# Patient Record
Sex: FEMALE | Race: WHITE | NOT HISPANIC OR LATINO | URBAN - METROPOLITAN AREA
[De-identification: names, ages, dates, MRNs, and addresses within clinical notes are randomized per-mention and may not be internally consistent; named-entity substitution may affect disease eponyms.]

---

## 2018-03-03 ENCOUNTER — EMERGENCY (EMERGENCY)
Facility: HOSPITAL | Age: 58
LOS: 1 days | Discharge: HOME | End: 2018-03-03
Attending: EMERGENCY MEDICINE | Admitting: EMERGENCY MEDICINE
Payer: COMMERCIAL

## 2018-03-03 VITALS
HEART RATE: 84 BPM | SYSTOLIC BLOOD PRESSURE: 119 MMHG | RESPIRATION RATE: 19 BRPM | TEMPERATURE: 99 F | OXYGEN SATURATION: 98 % | DIASTOLIC BLOOD PRESSURE: 69 MMHG

## 2018-03-03 VITALS
WEIGHT: 179.9 LBS | DIASTOLIC BLOOD PRESSURE: 97 MMHG | TEMPERATURE: 97 F | HEART RATE: 84 BPM | RESPIRATION RATE: 20 BRPM | SYSTOLIC BLOOD PRESSURE: 160 MMHG | OXYGEN SATURATION: 100 %
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
## 2018-03-03 DIAGNOSIS — Z23 ENCOUNTER FOR IMMUNIZATION: ICD-10-CM

## 2018-03-03 DIAGNOSIS — S01.01XA LACERATION WITHOUT FOREIGN BODY OF SCALP, INITIAL ENCOUNTER: ICD-10-CM
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
## 2018-03-03 DIAGNOSIS — F10.120 ALCOHOL ABUSE WITH INTOXICATION, UNCOMPLICATED: ICD-10-CM
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
## 2018-03-03 DIAGNOSIS — S09.90XA UNSPECIFIED INJURY OF HEAD, INITIAL ENCOUNTER: ICD-10-CM

## 2018-03-03 PROCEDURE — 70450 CT HEAD/BRAIN W/O DYE: CPT

## 2018-03-03 PROCEDURE — 72125 CT NECK SPINE W/O DYE: CPT | Mod: 26

## 2018-03-03 PROCEDURE — 90471 IMMUNIZATION ADMIN: CPT

## 2018-03-03 PROCEDURE — 99283 EMERGENCY DEPT VISIT LOW MDM: CPT | Mod: 25

## 2018-03-03 PROCEDURE — 12002 RPR S/N/AX/GEN/TRNK2.6-7.5CM: CPT

## 2018-03-03 PROCEDURE — 70450 CT HEAD/BRAIN W/O DYE: CPT | Mod: 26

## 2018-03-03 PROCEDURE — 99285 EMERGENCY DEPT VISIT HI MDM: CPT | Mod: 25

## 2018-03-03 PROCEDURE — 90715 TDAP VACCINE 7 YRS/> IM: CPT

## 2018-03-03 PROCEDURE — 82962 GLUCOSE BLOOD TEST: CPT

## 2018-03-03 PROCEDURE — 72125 CT NECK SPINE W/O DYE: CPT

## 2018-03-03 RX ORDER — TETANUS TOXOID, REDUCED DIPHTHERIA TOXOID AND ACELLULAR PERTUSSIS VACCINE, ADSORBED 5; 2.5; 8; 8; 2.5 [IU]/.5ML; [IU]/.5ML; UG/.5ML; UG/.5ML; UG/.5ML
0.5 SUSPENSION INTRAMUSCULAR ONCE
Qty: 0 | Refills: 0 | Status: COMPLETED | OUTPATIENT
Start: 2018-03-03 | End: 2018-03-03

## 2018-03-03 RX ADMIN — TETANUS TOXOID, REDUCED DIPHTHERIA TOXOID AND ACELLULAR PERTUSSIS VACCINE, ADSORBED 0.5 MILLILITER(S): 5; 2.5; 8; 8; 2.5 SUSPENSION INTRAMUSCULAR at 02:19

## 2018-03-03 NOTE — ED PROVIDER NOTE - OBJECTIVE STATEMENT
57F s/p fall from bed. per  pt intoxicated and fell out of bed accidentally.  hit head on ceramic, +laceration to L scalp. no LOC. no vomiting.  unk last tetanus.

## 2018-03-03 NOTE — ED PROVIDER NOTE - CARE PLAN
Principal Discharge DX:	Scalp laceration, initial encounter  Secondary Diagnosis:	Alcoholic intoxication without complication  Secondary Diagnosis:	Injury of head, initial encounter

## 2018-03-03 NOTE — ED PROVIDER NOTE - PROGRESS NOTE DETAILS
laceration irrigated and stapled, pressure dressing applied. will continue to monitor. aox3, fluent speech, steady gait,  at bedside to accompany pt home. advised staple removal in 1 week. bacitracin applied.  I have discussed the discharge plan with the patient. The patient agrees with the plan, as discussed.  The patient understands Emergency Department diagnosis is a preliminary diagnosis often based on limited information and that the patient must adhere to the follow-up plan as discussed.  The patient understands that if the symptoms worsen or if prescribed medications do not have the desired/planned effect that the patient may return to the Emergency Department at any time for further evaluation and treatment.

## 2018-03-03 NOTE — ED ADULT TRIAGE NOTE - ARRIVAL INFO ADDITIONAL COMMENTS
pt presents from home, "she rolled out of bed", occipital head lac (bandages in place by EMS). denies use of blood thinners. admits to drinking two glasses of wine

## 2018-03-03 NOTE — ED ADULT NURSE NOTE - OBJECTIVE STATEMENT
accdg to the fiance', pt took 2-4 glasses of wine and she is taking alcohol almost on everyday basis - accdg to the fiance', pt took 2-4 glasses of wine and she is taking alcohol almost on everyday basis

## 2019-01-17 ENCOUNTER — EMERGENCY (EMERGENCY)
Facility: HOSPITAL | Age: 59
LOS: 1 days | Discharge: HOME | End: 2019-01-17
Attending: EMERGENCY MEDICINE | Admitting: EMERGENCY MEDICINE
Payer: COMMERCIAL

## 2019-01-17 VITALS
OXYGEN SATURATION: 99 % | SYSTOLIC BLOOD PRESSURE: 129 MMHG | HEART RATE: 64 BPM | TEMPERATURE: 98 F | DIASTOLIC BLOOD PRESSURE: 71 MMHG | RESPIRATION RATE: 18 BRPM
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
## 2019-01-17 DIAGNOSIS — F10.129 ALCOHOL ABUSE WITH INTOXICATION, UNSPECIFIED: ICD-10-CM
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
## 2019-01-17 DIAGNOSIS — S01.01XA LACERATION WITHOUT FOREIGN BODY OF SCALP, INITIAL ENCOUNTER: ICD-10-CM
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
## 2019-01-17 DIAGNOSIS — S09.90XA UNSPECIFIED INJURY OF HEAD, INITIAL ENCOUNTER: ICD-10-CM
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
## 2019-01-17 PROCEDURE — 99285 EMERGENCY DEPT VISIT HI MDM: CPT | Mod: 25

## 2019-01-17 PROCEDURE — 70450 CT HEAD/BRAIN W/O DYE: CPT

## 2019-01-17 PROCEDURE — 12001 RPR S/N/AX/GEN/TRNK 2.5CM/<: CPT

## 2019-01-17 PROCEDURE — 72125 CT NECK SPINE W/O DYE: CPT | Mod: 26

## 2019-01-17 PROCEDURE — 70450 CT HEAD/BRAIN W/O DYE: CPT | Mod: 26

## 2019-01-17 PROCEDURE — 72125 CT NECK SPINE W/O DYE: CPT

## 2019-01-17 PROCEDURE — 99284 EMERGENCY DEPT VISIT MOD MDM: CPT | Mod: 25

## 2019-01-17 NOTE — ED PROVIDER NOTE - OBJECTIVE STATEMENT
58 yof pw fall, head injury, witnessed by her  in ED.  tdap last year.  no loc.  admits to EtOH use.  no pain.  no sx prior to fall. 58 yof pw fall, head injury, witnessed by her  in ED, reports hitting against a shelf.  tdap last year.  no loc.  admits to EtOH use.  no pain.  no sx prior to fall.

## 2019-01-17 NOTE — ED ADULT NURSE NOTE - OTHER COMPLAINTS
CC of AMS, had 5 glasses of wine and tripped and fall from home, lac on L occiput. not bleeding actively but able to answer few questions but still drowsy. denies LOC as per . + alcohol on breath

## 2019-01-17 NOTE — ED ADULT TRIAGE NOTE - OTHER COMPLAINTS
CC of AMS, had 5 glasses of wine and tripped and fall from home, lac on L occiput. not bleeding actively but able to answer few questions but still drowsy. denies LOC as per  CC of AMS, had 5 glasses of wine and tripped and fall from home, lac on L occiput. not bleeding actively but able to answer few questions but still drowsy. denies LOC as per . + alcohol on breath

## 2019-01-17 NOTE — ED PROVIDER NOTE - PROGRESS NOTE DETAILS
pt appears intoxicated, pending for CT result to return, however,  at bedside was causing interference to care, unable to verbally deescalate situation, security called to assist in patient care pt appears intoxicated, pending for CT result to return, however,  at bedside was causing interference to care (agitating patient), unable to verbally deescalate situation, security called to assist in patient care while pending for finalized CT result, pt eloped w/ .  attempted to find pt in ED

## 2019-01-17 NOTE — ED PROVIDER NOTE - PHYSICAL EXAMINATION
CON: ao x 3, HENMT: clear oropharynx, soft neck, no cervical midline tenderness, HEAD: posterior parietal lac, CV: rrr, equal pulses b/l, RESP: cta b/l, GI: +BS, soft, nontender, no rebound, no guarding, SKIN: no rash, MSK: no deformities, NEURO: ao x 3, moving all extremities, appears intoxicated CON: ao x 3, HENMT: clear oropharynx, soft neck, no cervical midline tenderness, HEAD: posterior parietal lac, CV: rrr, equal pulses b/l, RESP: cta b/l, GI: +BS, soft, nontender, no rebound, no guarding, SKIN: posterior parietal scalp lac left sided ~2.5cm, no galea injury noted, no active bleeding, dried blood noted, MSK: no deformities, NEURO: ao x 3, moving all extremities, appears intoxicated

## 2019-01-18 NOTE — ED ADULT NURSE REASSESSMENT NOTE - NS ED NURSE REASSESS COMMENT FT1
Pt  becoming inpatient, intrusive to patient care. does not want to wait for CT results of wife. Arguing and aggressive w/ MD Ruiz. Security called to bedside.

## 2020-11-12 PROBLEM — Z00.00 ENCOUNTER FOR PREVENTIVE HEALTH EXAMINATION: Status: ACTIVE | Noted: 2020-11-12

## 2020-11-16 ENCOUNTER — APPOINTMENT (OUTPATIENT)
Dept: ORTHOPEDIC SURGERY | Facility: CLINIC | Age: 60
End: 2020-11-16
Payer: COMMERCIAL

## 2020-11-16 VITALS — OXYGEN SATURATION: 99 % | HEART RATE: 100 BPM | SYSTOLIC BLOOD PRESSURE: 120 MMHG | DIASTOLIC BLOOD PRESSURE: 80 MMHG

## 2020-11-16 VITALS — HEIGHT: 65 IN | WEIGHT: 129 LBS | BODY MASS INDEX: 21.49 KG/M2

## 2020-11-16 DIAGNOSIS — F17.200 NICOTINE DEPENDENCE, UNSPECIFIED, UNCOMPLICATED: ICD-10-CM

## 2020-11-16 DIAGNOSIS — Z78.9 OTHER SPECIFIED HEALTH STATUS: ICD-10-CM

## 2020-11-16 DIAGNOSIS — Z86.79 PERSONAL HISTORY OF OTHER DISEASES OF THE CIRCULATORY SYSTEM: ICD-10-CM

## 2020-11-16 PROCEDURE — 99072 ADDL SUPL MATRL&STAF TM PHE: CPT

## 2020-11-16 PROCEDURE — 99204 OFFICE O/P NEW MOD 45 MIN: CPT

## 2020-11-16 RX ORDER — HYDROCHLOROTHIAZIDE 25 MG/1
25 TABLET ORAL
Refills: 0 | Status: ACTIVE | COMMUNITY

## 2020-11-16 RX ORDER — HYDROCHLOROTHIAZIDE 25 MG/1
25 TABLET ORAL
Qty: 30 | Refills: 0 | Status: ACTIVE | COMMUNITY
Start: 2020-07-02

## 2020-11-16 RX ORDER — LOSARTAN POTASSIUM 100 MG/1
TABLET, FILM COATED ORAL
Refills: 0 | Status: ACTIVE | COMMUNITY

## 2020-11-16 RX ORDER — LOSARTAN POTASSIUM 100 MG/1
100 TABLET, FILM COATED ORAL
Qty: 30 | Refills: 0 | Status: ACTIVE | COMMUNITY
Start: 2020-03-17

## 2020-11-16 NOTE — DISCUSSION/SUMMARY
[de-identified] : 61y/o female 9 days s/p right ankle Rose B, presumptive SER2 fracture\par - Cont PWB RLE\par - Transition to CAM walker\par - Ice/elevation/Tylenol as needed\par - RTC next week with new weightbearing right ankle XRs out of boot

## 2020-11-16 NOTE — PHYSICAL EXAM
[de-identified] : General appearance: well nourished and hydrated, pleasant, alert and oriented x 3, cooperative.  \par HEENT: normocephalic, EOM intact, wearing mask, external auditory canal clear.  \par Cardiovascular: no left lower leg edema, no varicosities, dorsalis pedis pulses palpable and symmetric.  \par Lymphatics: no palpable lymphadenopathy, no lymphedema.  \par Neurologic: sensation is normal, no muscle weakness in upper or lower extremities, patella tendon reflexes present and symmetric.  \par Dermatologic: skin moist, warm, no rash.  \par Spine: cervical spine with normal lordosis and painless range of motion, thoracic spine with normal kyphosis and painless range of motion, lumbosacral spine with normal lordosis and painless range of motion. \par Gait: antalgic right with crutches.\par \par Right ankle\par - Inspection: moderate lateral ankle and dorsal foot swelling, dorsal foot ecchymosis, no erythema.  \par - Wounds: none.  \par - Palpation: pain on palpation of lateral malleolus; no pain medially or along syndesmosis, no foot pain.  \par - ROM: deferred \par - Strength: deferred\par - Stability: stable on anterior and posterior drawers, hindfoot inversion/eversion.  [de-identified] : Imaging was reviewed from the urgent care. There is a Rose B oblique distal fibular fracture with mild displacement. No mortise widening or anterior/posterior talar translation. No other osseous injuries noted.

## 2020-11-16 NOTE — HISTORY OF PRESENT ILLNESS
[___ days] : [unfilled] day(s) ago [6] : a current pain level of 6/10 [de-identified] : 59y/o female presenting for right ankle fracture. Twisted her ankle in her kitchen on 11/7/20. Went to urgent care 4 days later where fx was diagnosed on imaging; she was given an aircast and hard soled shoe with crutches. Has been PWB since then. Complains of skin irritation from the aircast. Pain has been moderate. Swelling has been up and down. No prior right ankle injuries before. Had a right distal radius fracture years ago, uneventfully healed; no other fracture history. [Direct Pressure] : worsened by direct pressure [Walking] : worsened by walking [Acetaminophen] : relieved by acetaminophen [NSAIDs] : relieved by nonsteroidal anti-inflammatory drugs [Rest] : relieved by rest [de-identified] : describes localized sharp pain

## 2020-11-23 ENCOUNTER — APPOINTMENT (OUTPATIENT)
Dept: ORTHOPEDIC SURGERY | Facility: CLINIC | Age: 60
End: 2020-11-23
Payer: COMMERCIAL

## 2020-11-23 ENCOUNTER — RESULT REVIEW (OUTPATIENT)
Age: 60
End: 2020-11-23

## 2020-11-23 ENCOUNTER — OUTPATIENT (OUTPATIENT)
Dept: OUTPATIENT SERVICES | Facility: HOSPITAL | Age: 60
LOS: 1 days | End: 2020-11-23
Payer: COMMERCIAL

## 2020-11-23 VITALS — TEMPERATURE: 96 F

## 2020-11-23 PROCEDURE — 73610 X-RAY EXAM OF ANKLE: CPT

## 2020-11-23 PROCEDURE — 73610 X-RAY EXAM OF ANKLE: CPT | Mod: 26,RT

## 2020-11-23 PROCEDURE — 99213 OFFICE O/P EST LOW 20 MIN: CPT

## 2020-11-23 NOTE — PHYSICAL EXAM
[de-identified] : General appearance: well nourished and hydrated, pleasant, alert and oriented x 3, cooperative. \par HEENT: normocephalic, EOM intact, wearing mask, external auditory canal clear. \par Cardiovascular: no left lower leg edema, no varicosities, dorsalis pedis pulses palpable and symmetric. \par Lymphatics: no palpable lymphadenopathy, no lymphedema. \par Neurologic: sensation is normal, no muscle weakness in upper or lower extremities, patella tendon reflexes present and symmetric. \par Dermatologic: skin moist, warm, no rash. \par Spine: cervical spine with normal lordosis and painless range of motion, thoracic spine with normal kyphosis and painless range of motion, lumbosacral spine with normal lordosis and painless range of motion. \par Gait: able to demonstrate TTWB right without antalgia, on crutches.\par \par Right ankle\par - Inspection: improved lateral ankle and dorsal foot swelling and ecchymosis, no erythema. \par - Wounds: none. \par - Palpation: pain on palpation of lateral malleolus; no pain medially or along syndesmosis, no foot pain. \par - ROM: deferred \par - Strength: deferred\par - Stability: stable on anterior and posterior drawers, hindfoot inversion/eversion.  [de-identified] : Repeat right ankle XRs taken today demonstrate stable position of the mortise, no arthritis, no increased fracture diastasis or angulation.

## 2020-11-23 NOTE — HISTORY OF PRESENT ILLNESS
[de-identified] : 61y/o female following up for right SER2 fracture sustained 11/7/20. Has been much more comfortable with the CAM walker. Pain and swelling have improved. No complaints.

## 2020-11-23 NOTE — DISCUSSION/SUMMARY
[de-identified] : 59y/o female 2 weeks s/p right SER2 ankle fracture\par - Demonstrated appropriate crutch technique; she was able to replicate immediately\par - Wean crutches as tolerated; WBAT RLE\par - RTC on 12/17/20 with repeat right ankle XRs out of boot, possibly clear CAM at that time

## 2020-12-17 ENCOUNTER — RESULT REVIEW (OUTPATIENT)
Age: 60
End: 2020-12-17

## 2020-12-17 ENCOUNTER — APPOINTMENT (OUTPATIENT)
Dept: ORTHOPEDIC SURGERY | Facility: CLINIC | Age: 60
End: 2020-12-17
Payer: COMMERCIAL

## 2020-12-17 ENCOUNTER — OUTPATIENT (OUTPATIENT)
Dept: OUTPATIENT SERVICES | Facility: HOSPITAL | Age: 60
LOS: 1 days | End: 2020-12-17
Payer: COMMERCIAL

## 2020-12-17 VITALS
TEMPERATURE: 96.9 F | BODY MASS INDEX: 21.49 KG/M2 | HEIGHT: 65 IN | SYSTOLIC BLOOD PRESSURE: 120 MMHG | OXYGEN SATURATION: 99 % | HEART RATE: 100 BPM | WEIGHT: 129 LBS | DIASTOLIC BLOOD PRESSURE: 70 MMHG

## 2020-12-17 DIAGNOSIS — S82.831A OTHER FRACTURE OF UPPER AND LOWER END OF RIGHT FIBULA, INITIAL ENCOUNTER FOR CLOSED FRACTURE: ICD-10-CM

## 2020-12-17 PROCEDURE — 73610 X-RAY EXAM OF ANKLE: CPT | Mod: 26,RT

## 2020-12-17 PROCEDURE — 99213 OFFICE O/P EST LOW 20 MIN: CPT

## 2020-12-17 PROCEDURE — 99072 ADDL SUPL MATRL&STAF TM PHE: CPT

## 2020-12-17 PROCEDURE — 73610 X-RAY EXAM OF ANKLE: CPT

## 2020-12-17 NOTE — DISCUSSION/SUMMARY
[de-identified] : 59y/o female about 6 weeks s/p right SER2 ankle fracture\par - Start PT\par - Wean CAM and cane\par - Cont walking for exercise\par - RTC 2mo, new right ankle XRs

## 2020-12-17 NOTE — PHYSICAL EXAM
[de-identified] : General appearance: well nourished and hydrated, pleasant, alert and oriented x 3, cooperative. \par HEENT: normocephalic, EOM intact, wearing mask, external auditory canal clear. \par Cardiovascular: no left lower leg edema, no varicosities, dorsalis pedis pulses palpable and symmetric. \par Lymphatics: no palpable lymphadenopathy, no lymphedema. \par Neurologic: sensation is normal, no muscle weakness in upper or lower extremities, patella tendon reflexes present and symmetric. \par Dermatologic: skin moist, warm, no rash. \par Spine: cervical spine with normal lordosis and painless range of motion, thoracic spine with normal kyphosis and painless range of motion, lumbosacral spine with normal lordosis and painless range of motion. \par Gait: using right CAM and cane; no antalgia, normal stride length.\par \par Right ankle\par - Inspection: improved lateral ankle and dorsal foot swelling and ecchymosis, no erythema. \par - Wounds: none. \par - Palpation: pain on palpation of anterolateral aspect lateral malleolus; no pain medially or along syndesmosis, no foot pain. \par - ROM: 10 DF - 30 PF, both mildly restricted compared to contralateral\par - Strength: 5/5 strength in all planes, though some anterolateral ankle pain with resisted plantarflexion.\par - Stability: stable on anterior and posterior drawers, hindfoot inversion/eversion.  [de-identified] : Right ankle XRs taken today demonstrate interval healing of the distal fibular fracture in acceptable alignment. Mortise remains stable. No tibiotalar arthritis.

## 2020-12-17 NOTE — HISTORY OF PRESENT ILLNESS
[de-identified] : 59y/o female following up for right SER2 fracture sustained 11/7/20. Has been continuing with CAM and cane. Pain and swelling continuing to improve, though still with some mild lateral malleolar pain with certain stresses. Feels her ankle is a bit stiff.

## 2021-02-11 ENCOUNTER — OUTPATIENT (OUTPATIENT)
Dept: OUTPATIENT SERVICES | Facility: HOSPITAL | Age: 61
LOS: 1 days | End: 2021-02-11
Payer: COMMERCIAL

## 2021-02-11 ENCOUNTER — RESULT REVIEW (OUTPATIENT)
Age: 61
End: 2021-02-11

## 2021-02-11 ENCOUNTER — APPOINTMENT (OUTPATIENT)
Dept: ORTHOPEDIC SURGERY | Facility: CLINIC | Age: 61
End: 2021-02-11
Payer: COMMERCIAL

## 2021-02-11 DIAGNOSIS — S82.831D OTHER FRACTURE OF UPPER AND LOWER END OF RIGHT FIBULA, SUBSEQUENT ENCOUNTER FOR CLOSED FRACTURE WITH ROUTINE HEALING: ICD-10-CM

## 2021-02-11 PROCEDURE — 99072 ADDL SUPL MATRL&STAF TM PHE: CPT

## 2021-02-11 PROCEDURE — 73610 X-RAY EXAM OF ANKLE: CPT

## 2021-02-11 PROCEDURE — 99213 OFFICE O/P EST LOW 20 MIN: CPT

## 2021-02-11 PROCEDURE — 73610 X-RAY EXAM OF ANKLE: CPT | Mod: 26,RT

## 2021-02-11 NOTE — HISTORY OF PRESENT ILLNESS
[de-identified] : 59y/o female following up for right SER2 fracture sustained 11/7/20. Reports that she is feeling nearly back to 100%. She finished her PT and is out of the ankle brace, using regular shoewear. No pain or stiffness. She only complains of some ongoing bunion pain on the same side despite wearing comfortable shoes.

## 2021-02-11 NOTE — DISCUSSION/SUMMARY
[de-identified] : 61y/o female with healed right SER2 ankle fracture, moderately symptomatic bunion\par - Return to all activities as tolerated without restrictions\par - Refer to [facility] for the bunion\par - Follow up with me as needed

## 2021-02-11 NOTE — PHYSICAL EXAM
[de-identified] : General appearance: well nourished and hydrated, pleasant, alert and oriented x 3, cooperative. \par HEENT: normocephalic, EOM intact, wearing mask, external auditory canal clear. \par Cardiovascular: no left lower leg edema, no varicosities, dorsalis pedis pulses palpable and symmetric. \par Lymphatics: no palpable lymphadenopathy, no lymphedema. \par Neurologic: sensation is normal, no muscle weakness in upper or lower extremities, patella tendon reflexes present and symmetric. \par Dermatologic: skin moist, warm, no rash. \par Spine: cervical spine with normal lordosis and painless range of motion, thoracic spine with normal kyphosis and painless range of motion, lumbosacral spine with normal lordosis and painless range of motion. \par Gait: normal.\par \par Right ankle\par - Inspection: resolved lateral ankle and dorsal foot swelling and ecchymosis, negative erythema. \par - Wounds: none. \par - Palpation: nontender to palpation about ankle. Moderately tender over medial aspect of bunion.\par - ROM: 20 DF - 60 PF, symmetric to contralateral\par - Strength: 5/5 strength in all planes without pain.\par - Stability: stable on anterior and posterior drawers, hindfoot inversion/eversion.  [de-identified] : Right ankle XRs taken today were reviewed by me and discussed with the patient. There has been interval healing of the distal fibular fracture in unchanged alignment as compared to prior films. Ankle alignment is normal. Mortise remains stable. No tibiotalar or hindfoot arthrosis.

## 2021-08-04 ENCOUNTER — EMERGENCY (EMERGENCY)
Facility: HOSPITAL | Age: 61
LOS: 1 days | Discharge: HOME | End: 2021-08-04
Attending: EMERGENCY MEDICINE | Admitting: EMERGENCY MEDICINE
Payer: COMMERCIAL

## 2021-08-04 VITALS
DIASTOLIC BLOOD PRESSURE: 79 MMHG | RESPIRATION RATE: 20 BRPM | HEIGHT: 64 IN | TEMPERATURE: 98 F | OXYGEN SATURATION: 97 % | SYSTOLIC BLOOD PRESSURE: 135 MMHG | HEART RATE: 79 BPM | WEIGHT: 128.09 LBS

## 2021-08-04 DIAGNOSIS — R07.89 OTHER CHEST PAIN: ICD-10-CM

## 2021-08-04 DIAGNOSIS — J40 BRONCHITIS, NOT SPECIFIED AS ACUTE OR CHRONIC: ICD-10-CM

## 2021-08-04 DIAGNOSIS — R06.02 SHORTNESS OF BREATH: ICD-10-CM

## 2021-08-04 DIAGNOSIS — Z20.822 CONTACT WITH AND (SUSPECTED) EXPOSURE TO COVID-19: ICD-10-CM

## 2021-08-04 LAB
ALBUMIN SERPL ELPH-MCNC: 3.9 G/DL — SIGNIFICANT CHANGE UP (ref 3.3–5)
ALP SERPL-CCNC: 55 U/L — SIGNIFICANT CHANGE UP (ref 40–120)
ALT FLD-CCNC: 13 U/L — SIGNIFICANT CHANGE UP (ref 10–45)
ANION GAP SERPL CALC-SCNC: 12 MMOL/L — SIGNIFICANT CHANGE UP (ref 5–17)
ANISOCYTOSIS BLD QL: SLIGHT — SIGNIFICANT CHANGE UP
AST SERPL-CCNC: 22 U/L — SIGNIFICANT CHANGE UP (ref 10–40)
BASOPHILS # BLD AUTO: 0.25 K/UL — HIGH (ref 0–0.2)
BASOPHILS NFR BLD AUTO: 2.6 % — HIGH (ref 0–2)
BILIRUB SERPL-MCNC: <0.2 MG/DL — SIGNIFICANT CHANGE UP (ref 0.2–1.2)
BUN SERPL-MCNC: 12 MG/DL — SIGNIFICANT CHANGE UP (ref 7–23)
CALCIUM SERPL-MCNC: 9 MG/DL — SIGNIFICANT CHANGE UP (ref 8.4–10.5)
CHLORIDE SERPL-SCNC: 106 MMOL/L — SIGNIFICANT CHANGE UP (ref 96–108)
CO2 SERPL-SCNC: 24 MMOL/L — SIGNIFICANT CHANGE UP (ref 22–31)
CREAT SERPL-MCNC: 0.56 MG/DL — SIGNIFICANT CHANGE UP (ref 0.5–1.3)
CRP SERPL-MCNC: 1 MG/L — SIGNIFICANT CHANGE UP (ref 0–4)
D DIMER BLD IA.RAPID-MCNC: <150 NG/ML DDU — SIGNIFICANT CHANGE UP
EOSINOPHIL # BLD AUTO: 0.7 K/UL — HIGH (ref 0–0.5)
EOSINOPHIL NFR BLD AUTO: 7.1 % — HIGH (ref 0–6)
FERRITIN SERPL-MCNC: 116 NG/ML — SIGNIFICANT CHANGE UP (ref 15–150)
GIANT PLATELETS BLD QL SMEAR: PRESENT — SIGNIFICANT CHANGE UP
GLUCOSE SERPL-MCNC: 97 MG/DL — SIGNIFICANT CHANGE UP (ref 70–99)
HCT VFR BLD CALC: 35.6 % — SIGNIFICANT CHANGE UP (ref 34.5–45)
HGB BLD-MCNC: 11.6 G/DL — SIGNIFICANT CHANGE UP (ref 11.5–15.5)
LYMPHOCYTES # BLD AUTO: 4.77 K/UL — HIGH (ref 1–3.3)
LYMPHOCYTES # BLD AUTO: 48.7 % — HIGH (ref 13–44)
MANUAL SMEAR VERIFICATION: SIGNIFICANT CHANGE UP
MCHC RBC-ENTMCNC: 31.8 PG — SIGNIFICANT CHANGE UP (ref 27–34)
MCHC RBC-ENTMCNC: 32.6 GM/DL — SIGNIFICANT CHANGE UP (ref 32–36)
MCV RBC AUTO: 97.5 FL — SIGNIFICANT CHANGE UP (ref 80–100)
MONOCYTES # BLD AUTO: 0.61 K/UL — SIGNIFICANT CHANGE UP (ref 0–0.9)
MONOCYTES NFR BLD AUTO: 6.2 % — SIGNIFICANT CHANGE UP (ref 2–14)
NEUTROPHILS # BLD AUTO: 3.29 K/UL — SIGNIFICANT CHANGE UP (ref 1.8–7.4)
NEUTROPHILS NFR BLD AUTO: 33.6 % — LOW (ref 43–77)
NT-PROBNP SERPL-SCNC: 176 PG/ML — SIGNIFICANT CHANGE UP (ref 0–300)
PLAT MORPH BLD: ABNORMAL
PLATELET # BLD AUTO: 257 K/UL — SIGNIFICANT CHANGE UP (ref 150–400)
POLYCHROMASIA BLD QL SMEAR: SLIGHT — SIGNIFICANT CHANGE UP
POTASSIUM SERPL-MCNC: 4 MMOL/L — SIGNIFICANT CHANGE UP (ref 3.5–5.3)
POTASSIUM SERPL-SCNC: 4 MMOL/L — SIGNIFICANT CHANGE UP (ref 3.5–5.3)
PROT SERPL-MCNC: 6.9 G/DL — SIGNIFICANT CHANGE UP (ref 6–8.3)
RBC # BLD: 3.65 M/UL — LOW (ref 3.8–5.2)
RBC # FLD: 12.4 % — SIGNIFICANT CHANGE UP (ref 10.3–14.5)
RBC BLD AUTO: ABNORMAL
SARS-COV-2 RNA SPEC QL NAA+PROBE: NEGATIVE — SIGNIFICANT CHANGE UP
SMUDGE CELLS # BLD: PRESENT — SIGNIFICANT CHANGE UP
SODIUM SERPL-SCNC: 142 MMOL/L — SIGNIFICANT CHANGE UP (ref 135–145)
TROPONIN T SERPL-MCNC: 0.01 NG/ML — SIGNIFICANT CHANGE UP (ref 0–0.01)
VARIANT LYMPHS # BLD: 1.8 % — SIGNIFICANT CHANGE UP (ref 0–6)
WBC # BLD: 9.8 K/UL — SIGNIFICANT CHANGE UP (ref 3.8–10.5)
WBC # FLD AUTO: 9.8 K/UL — SIGNIFICANT CHANGE UP (ref 3.8–10.5)

## 2021-08-04 PROCEDURE — 71045 X-RAY EXAM CHEST 1 VIEW: CPT | Mod: 26

## 2021-08-04 PROCEDURE — 99285 EMERGENCY DEPT VISIT HI MDM: CPT

## 2021-08-04 NOTE — ED ADULT TRIAGE NOTE - CHIEF COMPLAINT QUOTE
pt reports "I cant breath." Pt c.o SOB starting last night getting worse during the day. Pt denies CP, no fever/chills.

## 2021-08-04 NOTE — ED ADULT NURSE NOTE - OBJECTIVE STATEMENT
received A&OX3, ambulatory 61years old female pt with c/o of " I have SOB since last night." pt denies chest pain, fever, nausea, or vomiting. pt is a chronic smoker, pt has hx of smoking 1 pack a day but past few days, she has been smoking 1-2 cigarettes a day. pt is able to talk in full sentence and non-labored breathing. pt reports she wants to cough but having hard time coughing up. EKG performed and vital signs are stable. no hx of cardiothoracic disease. no edema in the legs. pt is vaccinated for covid-19.

## 2021-08-05 VITALS
DIASTOLIC BLOOD PRESSURE: 76 MMHG | OXYGEN SATURATION: 98 % | HEART RATE: 72 BPM | SYSTOLIC BLOOD PRESSURE: 135 MMHG | RESPIRATION RATE: 18 BRPM | TEMPERATURE: 98 F

## 2021-08-05 LAB — TROPONIN T SERPL-MCNC: <0.01 NG/ML — SIGNIFICANT CHANGE UP (ref 0–0.01)

## 2021-08-05 PROCEDURE — 83880 ASSAY OF NATRIURETIC PEPTIDE: CPT

## 2021-08-05 PROCEDURE — 71045 X-RAY EXAM CHEST 1 VIEW: CPT

## 2021-08-05 PROCEDURE — 71275 CT ANGIOGRAPHY CHEST: CPT | Mod: 26,MC

## 2021-08-05 PROCEDURE — 87635 SARS-COV-2 COVID-19 AMP PRB: CPT

## 2021-08-05 PROCEDURE — 86140 C-REACTIVE PROTEIN: CPT

## 2021-08-05 PROCEDURE — 80053 COMPREHEN METABOLIC PANEL: CPT

## 2021-08-05 PROCEDURE — 93005 ELECTROCARDIOGRAM TRACING: CPT

## 2021-08-05 PROCEDURE — 84484 ASSAY OF TROPONIN QUANT: CPT

## 2021-08-05 PROCEDURE — 99284 EMERGENCY DEPT VISIT MOD MDM: CPT | Mod: 25

## 2021-08-05 PROCEDURE — 85025 COMPLETE CBC W/AUTO DIFF WBC: CPT

## 2021-08-05 PROCEDURE — 85379 FIBRIN DEGRADATION QUANT: CPT

## 2021-08-05 PROCEDURE — 82728 ASSAY OF FERRITIN: CPT

## 2021-08-05 PROCEDURE — 71275 CT ANGIOGRAPHY CHEST: CPT | Mod: MC

## 2021-08-05 PROCEDURE — 36415 COLL VENOUS BLD VENIPUNCTURE: CPT

## 2021-08-05 RX ORDER — ALBUTEROL 90 UG/1
2 AEROSOL, METERED ORAL
Qty: 56 | Refills: 0
Start: 2021-08-05 | End: 2021-08-11

## 2021-08-05 NOTE — ED PROVIDER NOTE - PATIENT PORTAL LINK FT
You can access the FollowMyHealth Patient Portal offered by [facility] by registering at the following website: [email or web address]. By joining [employer]’s FollowMyHealth portal, you will also be able to view your health information using other applications (apps) compatible with our system.

## 2021-08-05 NOTE — ED PROVIDER NOTE - OBJECTIVE STATEMENT
60 y/o F pt with no pertinent PMHx and PSHx presents to ED c/o shortness of breath since last night. Pt relates having cough x 1 week, and mild chest discomfort associated with worsening shortness of breath at rest since last night. Pt denies fever or any other acute complaints. In ED, she is speaking in full sentences, well appearing, and VS stable.

## 2021-08-05 NOTE — ED PROVIDER NOTE - CLINICAL SUMMARY MEDICAL DECISION MAKING FREE TEXT BOX
62 y/o F pt presents to ED with shortness of breath. Plan for labs, EKG, CTA PE study, and reassess. 60 y/o F pt presents to ED with shortness of breath. Plan for labs, EKG, CTA PE study, and reassess.  Pt well appearing in ED, in NAD. CTA with thyroid goiter as well as pulmonary nodule- both known to patient. Pt ambulating at baseline, trop x2 wnl. no CHF. Findings suggestive of bronchitis. Pt given Rx for inhaler. Pt given instructions for outpatient follow up, return precautions given.

## 2021-08-05 NOTE — ED PROVIDER NOTE - NSFOLLOWUPINSTRUCTIONS_ED_ALL_ED_FT
Shortness of breath    Shortness of breath (dyspnea) means you have trouble breathing and could indicate a medical problem. Causes include lung disease, heart disease, low amount of red blood cells (anemia), poor physical fitness, being overweight, smoking, etc. Your health care provider today may not be able to find a cause for your shortness of breath after your exam. In this case, it is important to have a follow-up exam with your primary care physician as instructed. If medicines were prescribed, take them as directed for the full length of time directed. Refrain from tobacco products.    SEEK IMMEDIATE MEDICAL CARE IF YOU HAVE ANY OF THE FOLLOWING SYMPTOMS: worsening shortness of breath, chest pain, back pain, abdominal pain, fever, coughing up blood, lightheadedness/dizziness.        Acute Bronchitis    AMBULATORY CARE:    Acute bronchitis is swelling and irritation in your lungs. It is usually caused by a virus and most often happens in the winter. Bronchitis may also be caused by bacteria or by a chemical irritant, such as smoke.    Common symptoms include the following:   •Cough that lasts up to 3 weeks, stuffy nose      •Hoarseness, sore throat      •A fever and chills      •Feeling more tired than usual, and body aches      •Wheezing or pain when you breathe or cough      Seek care immediately if:   •You cough up blood.      •Your lips or fingernails turn blue.      •You feel like you are not getting enough air when you breathe.      Call your doctor if:   •Your symptoms do not go away or get worse, even after treatment.      •Your cough does not get better within 4 weeks.      •You have questions or concerns about your condition or care.      Medicines: You may need any of the following:   •Cough suppressants decrease your urge to cough.      •Decongestants help loosen mucus in your lungs and make it easier to cough up. This can help you breathe easier.      •Inhalers may be given. Your healthcare provider may give you one or more inhalers to help you breathe easier and cough less. An inhaler gives your medicine to open your airways. Ask your healthcare provider to show you how to use your inhaler correctly.  Metered Dose Inhaler           •Antibiotics may be given for up to 5 days if your bronchitis is caused by bacteria.      •Acetaminophen decreases pain and fever. It is available without a doctor's order. Ask how much to take and how often to take it. Follow directions. Read the labels of all other medicines you are using to see if they also contain acetaminophen, or ask your doctor or pharmacist. Acetaminophen can cause liver damage if not taken correctly. Do not use more than 4 grams (4,000 milligrams) total of acetaminophen in one day.       •NSAIDs help decrease swelling and pain or fever. This medicine is available with or without a doctor's order. NSAIDs can cause stomach bleeding or kidney problems in certain people. If you take blood thinner medicine, always ask your healthcare provider if NSAIDs are safe for you. Always read the medicine label and follow directions.      •Take your medicine as directed. Contact your healthcare provider if you think your medicine is not helping or if you have side effects. Tell him of her if you are allergic to any medicine. Keep a list of the medicines, vitamins, and herbs you take. Include the amounts, and when and why you take them. Bring the list or the pill bottles to follow-up visits. Carry your medicine list with you in case of an emergency.      Self-care:   •Drink liquids as directed. You may need to drink more liquids than usual to stay hydrated. Ask how much liquid to drink each day and which liquids are best for you.      •Use a cool mist humidifier to increase air moisture in your home. This may make it easier for you to breathe and help decrease your cough.      •Get more rest. Rest helps your body to heal. Slowly start to do more each day. Rest when you feel it is needed.      •Avoid irritants in the air. Avoid chemicals, fumes, and dust. Wear a face mask if you must work around dust or fumes. Stay inside on days when air pollution levels are high. If you have allergies, stay inside when pollen counts are high. Do not use aerosol products, such as spray-on deodorant, bug spray, and hair spray.      •Do not smoke or be around others who are smoking. Nicotine and other chemicals in cigarettes and cigars can cause lung damage. Ask your healthcare provider for information if you currently smoke and need help to quit. E-cigarettes or smokeless tobacco still contain nicotine. Talk to your healthcare provider before you use these products.      Prevent acute bronchitis:          •Ask about vaccines you may need. Get a flu vaccine each year as soon as recommended, usually in September or October. Ask your healthcare provider if you should also get a pneumonia or COVID-19 vaccine. Your healthcare provider can tell you if you should also get other vaccines, and when to get them.      •Prevent the spread of germs. You can decrease your risk for acute bronchitis and other illnesses by doing the following:?Wash your hands often with soap and water. Carry germ-killing hand lotion or gel with you. You can use the lotion or gel to clean your hands when soap and water are not available.  Handwashing           ?Do not touch your eyes, nose, or mouth unless you have washed your hands first.      ?Always cover your mouth when you cough to prevent the spread of germs. It is best to cough into a tissue or your shirt sleeve instead of into your hand. Ask those around you to cover their mouths when they cough.      ?Try to avoid people who have a cold or the flu. If you are sick, stay away from others as much as possible.        Follow up with your doctor as directed: Write down questions you have so you will remember to ask them during your follow-up visits.       © Copyright [email or web address] 2021

## 2022-03-02 ENCOUNTER — APPOINTMENT (OUTPATIENT)
Dept: CT IMAGING | Facility: HOSPITAL | Age: 62
End: 2022-03-02

## 2022-03-11 ENCOUNTER — OUTPATIENT (OUTPATIENT)
Dept: OUTPATIENT SERVICES | Facility: HOSPITAL | Age: 62
LOS: 1 days | End: 2022-03-11
Payer: COMMERCIAL

## 2022-03-11 ENCOUNTER — APPOINTMENT (OUTPATIENT)
Dept: CT IMAGING | Facility: HOSPITAL | Age: 62
End: 2022-03-11

## 2022-03-11 LAB — POCT ISTAT CREATININE: 0.5 MG/DL — SIGNIFICANT CHANGE UP (ref 0.5–1.3)

## 2022-03-11 PROCEDURE — 71260 CT THORAX DX C+: CPT | Mod: 26

## 2022-03-11 PROCEDURE — 82565 ASSAY OF CREATININE: CPT

## 2022-03-11 PROCEDURE — 71260 CT THORAX DX C+: CPT

## 2022-09-19 ENCOUNTER — APPOINTMENT (OUTPATIENT)
Dept: CT IMAGING | Facility: HOSPITAL | Age: 62
End: 2022-09-19

## 2022-09-19 ENCOUNTER — OUTPATIENT (OUTPATIENT)
Dept: OUTPATIENT SERVICES | Facility: HOSPITAL | Age: 62
LOS: 1 days | End: 2022-09-19
Payer: COMMERCIAL

## 2022-09-19 LAB — POCT ISTAT CREATININE: 0.7 MG/DL — SIGNIFICANT CHANGE UP (ref 0.5–1.3)

## 2022-09-19 PROCEDURE — 71260 CT THORAX DX C+: CPT

## 2022-09-19 PROCEDURE — 71260 CT THORAX DX C+: CPT | Mod: 26

## 2022-09-19 PROCEDURE — 82565 ASSAY OF CREATININE: CPT

## 2023-09-13 ENCOUNTER — OUTPATIENT (OUTPATIENT)
Dept: OUTPATIENT SERVICES | Facility: HOSPITAL | Age: 63
LOS: 1 days | End: 2023-09-13
Payer: COMMERCIAL

## 2023-09-13 ENCOUNTER — APPOINTMENT (OUTPATIENT)
Dept: CT IMAGING | Facility: HOSPITAL | Age: 63
End: 2023-09-13

## 2023-09-13 PROCEDURE — 71260 CT THORAX DX C+: CPT

## 2023-09-13 PROCEDURE — 71260 CT THORAX DX C+: CPT | Mod: 26
